# Patient Record
Sex: MALE | Race: WHITE | NOT HISPANIC OR LATINO | Employment: OTHER | URBAN - METROPOLITAN AREA
[De-identification: names, ages, dates, MRNs, and addresses within clinical notes are randomized per-mention and may not be internally consistent; named-entity substitution may affect disease eponyms.]

---

## 2023-05-24 RX ORDER — CLOPIDOGREL BISULFATE 75 MG/1
TABLET ORAL DAILY
COMMUNITY

## 2023-05-24 RX ORDER — TRAMADOL HYDROCHLORIDE 100 MG/1
1 TABLET, COATED ORAL 3 TIMES DAILY
COMMUNITY

## 2023-05-24 RX ORDER — GABAPENTIN 300 MG/1
300 CAPSULE ORAL 3 TIMES DAILY
COMMUNITY

## 2023-05-24 RX ORDER — ENALAPRIL MALEATE 10 MG/1
10 TABLET ORAL DAILY
COMMUNITY

## 2023-05-24 RX ORDER — BUSPIRONE HYDROCHLORIDE 10 MG/1
10 TABLET ORAL DAILY
COMMUNITY

## 2023-05-24 RX ORDER — METOPROLOL TARTRATE 50 MG/1
50 TABLET ORAL 2 TIMES DAILY
COMMUNITY

## 2023-05-24 RX ORDER — ZOLPIDEM TARTRATE 5 MG/1
5 TABLET ORAL NIGHTLY PRN
COMMUNITY

## 2023-05-24 RX ORDER — POLYETHYLENE GLYCOL 3350 17 G/17G
17 POWDER, FOR SOLUTION ORAL DAILY
COMMUNITY

## 2023-05-24 NOTE — DISCHARGE INSTRUCTIONS
Patient Education       Amputation of the Toe Discharge Instructions      What care is needed at home?   Prepare your home to help with your recovery.  Do not change your dressing.  Sponge bathe only. You may take a shower when released by your doctor. No soaking in the tub until your incision is healed.  Follow your doctor's instructions on how you will get around at home. You will need a shoe to walk.  If you smoke, stop. Smoking lowers blood flow and can slow healing.  If you have diabetes, continue to monitor your blood sugar. Take your drugs as ordered.  You may need to wear a special shoe to protect your foot until the stitches are taken out.  You may put gauze and tape to the incision if it bleeds minimally.  What follow-up care is needed?   Be sure to keep your follow up visits.  Your doctor may send you to rehab where you will learn to move around safely and get stronger before you go home.  If you have stitches or staples, you will need to have them taken out. Your doctor will often want to do this in 1 to 2 weeks.  Your doctor may send you to a counselor for talk therapy to help you cope with the loss of your foot or toes.  Will physical activity be limited?   You may have to limit your activity. Talk to your doctor about the right amount of activity for you.  What problems could happen?   Poor wound healing or spread of dead tissue. This might require amputation of more of your foot, toes, or leg.  Infection  Very bad pain in the remaining tissue. This is stump pain.  Painful feeling that the foot or toe is still there. This is phantom pain.  Bleeding  Nerve damage  Limp ? depending on which toe has been removed  When do I need to call the doctor?   Signs of infection. These include a fever of 100.4°F (38°C) or higher, chills, or wound that will not heal.  Signs of wound infection. These include swelling, redness, warmth around the wound; too much pain when touched; yellowish, greenish, or bloody  discharge; foul smell coming from the cut site; cut site opens up.  Trouble breathing or not able to take a deep breath  Numbness or tingling feeling in the rest of your foot, toes, or leg  Chalky white, blue, or black color in the rest of your foot, toes, or leg  Pain that you cannot control with the drugs you have been given

## 2023-05-25 ENCOUNTER — ANESTHESIA (OUTPATIENT)
Dept: SURGERY | Facility: HOSPITAL | Age: 88
End: 2023-05-25
Payer: MEDICARE

## 2023-05-25 ENCOUNTER — ANESTHESIA EVENT (OUTPATIENT)
Dept: SURGERY | Facility: HOSPITAL | Age: 88
End: 2023-05-25
Payer: MEDICARE

## 2023-05-25 ENCOUNTER — HOSPITAL ENCOUNTER (OUTPATIENT)
Facility: HOSPITAL | Age: 88
Discharge: HOME | End: 2023-05-25
Attending: PODIATRIST | Admitting: PODIATRIST
Payer: MEDICARE

## 2023-05-25 DIAGNOSIS — L97.521 NON-PRESSURE CHRONIC ULCER OF OTHER PART OF LEFT FOOT LIMITED TO BREAKDOWN OF SKIN: Primary | ICD-10-CM

## 2023-05-25 DIAGNOSIS — L97.522 ULCER OF LEFT FOOT, WITH FAT LAYER EXPOSED: ICD-10-CM

## 2023-05-25 PROCEDURE — 93005 ELECTROCARDIOGRAM TRACING: CPT

## 2023-05-25 PROCEDURE — 63600175 PHARM REV CODE 636 W HCPCS: Performed by: NURSE ANESTHETIST, CERTIFIED REGISTERED

## 2023-05-25 PROCEDURE — C1729 CATH, DRAINAGE: HCPCS | Performed by: PODIATRIST

## 2023-05-25 PROCEDURE — 25000003 PHARM REV CODE 250: Performed by: PODIATRIST

## 2023-05-25 PROCEDURE — 88305 TISSUE EXAM BY PATHOLOGIST: CPT | Performed by: PODIATRIST

## 2023-05-25 PROCEDURE — 87075 CULTR BACTERIA EXCEPT BLOOD: CPT | Performed by: PODIATRIST

## 2023-05-25 PROCEDURE — 37000009 HC ANESTHESIA EA ADD 15 MINS: Performed by: PODIATRIST

## 2023-05-25 PROCEDURE — 87205 SMEAR GRAM STAIN: CPT | Performed by: PODIATRIST

## 2023-05-25 PROCEDURE — 71000016 HC POSTOP RECOV ADDL HR: Performed by: PODIATRIST

## 2023-05-25 PROCEDURE — 36000707: Performed by: PODIATRIST

## 2023-05-25 PROCEDURE — 36000706: Performed by: PODIATRIST

## 2023-05-25 PROCEDURE — D9220A PRA ANESTHESIA: Mod: ,,, | Performed by: NURSE ANESTHETIST, CERTIFIED REGISTERED

## 2023-05-25 PROCEDURE — 37000008 HC ANESTHESIA 1ST 15 MINUTES: Performed by: PODIATRIST

## 2023-05-25 PROCEDURE — 71000015 HC POSTOP RECOV 1ST HR: Performed by: PODIATRIST

## 2023-05-25 PROCEDURE — 88311 DECALCIFY TISSUE: CPT

## 2023-05-25 PROCEDURE — D9220A PRA ANESTHESIA: ICD-10-PCS | Mod: ,,, | Performed by: NURSE ANESTHETIST, CERTIFIED REGISTERED

## 2023-05-25 PROCEDURE — 87070 CULTURE OTHR SPECIMN AEROBIC: CPT | Performed by: PODIATRIST

## 2023-05-25 RX ORDER — LIDOCAINE HYDROCHLORIDE 10 MG/ML
1 INJECTION, SOLUTION EPIDURAL; INFILTRATION; INTRACAUDAL; PERINEURAL ONCE
Status: DISCONTINUED | OUTPATIENT
Start: 2023-05-25 | End: 2023-05-25 | Stop reason: HOSPADM

## 2023-05-25 RX ORDER — HYDROMORPHONE HYDROCHLORIDE 2 MG/ML
0.2 INJECTION, SOLUTION INTRAMUSCULAR; INTRAVENOUS; SUBCUTANEOUS EVERY 5 MIN PRN
Status: DISCONTINUED | OUTPATIENT
Start: 2023-05-25 | End: 2023-05-25 | Stop reason: HOSPADM

## 2023-05-25 RX ORDER — ONDANSETRON 2 MG/ML
INJECTION INTRAMUSCULAR; INTRAVENOUS
Status: DISCONTINUED | OUTPATIENT
Start: 2023-05-25 | End: 2023-05-25

## 2023-05-25 RX ORDER — SODIUM CHLORIDE 9 MG/ML
INJECTION, SOLUTION INTRAVENOUS CONTINUOUS
Status: DISCONTINUED | OUTPATIENT
Start: 2023-05-25 | End: 2023-05-25 | Stop reason: HOSPADM

## 2023-05-25 RX ORDER — CEFAZOLIN SODIUM 1 G/3ML
1 INJECTION, POWDER, FOR SOLUTION INTRAMUSCULAR; INTRAVENOUS ONCE
Status: DISCONTINUED | OUTPATIENT
Start: 2023-05-25 | End: 2023-05-25 | Stop reason: HOSPADM

## 2023-05-25 RX ORDER — LIDOCAINE HYDROCHLORIDE 10 MG/ML
1 INJECTION, SOLUTION EPIDURAL; INFILTRATION; INTRACAUDAL; PERINEURAL ONCE AS NEEDED
Status: DISCONTINUED | OUTPATIENT
Start: 2023-05-25 | End: 2023-05-25 | Stop reason: HOSPADM

## 2023-05-25 RX ORDER — BUPIVACAINE HYDROCHLORIDE 2.5 MG/ML
INJECTION, SOLUTION EPIDURAL; INFILTRATION; INTRACAUDAL
Status: DISCONTINUED
Start: 2023-05-25 | End: 2023-05-25 | Stop reason: HOSPADM

## 2023-05-25 RX ORDER — ONDANSETRON 2 MG/ML
4 INJECTION INTRAMUSCULAR; INTRAVENOUS ONCE AS NEEDED
Status: DISCONTINUED | OUTPATIENT
Start: 2023-05-25 | End: 2023-05-25 | Stop reason: HOSPADM

## 2023-05-25 RX ORDER — CEFAZOLIN SODIUM 1 G/3ML
INJECTION, POWDER, FOR SOLUTION INTRAMUSCULAR; INTRAVENOUS
Status: DISCONTINUED | OUTPATIENT
Start: 2023-05-25 | End: 2023-05-25

## 2023-05-25 RX ORDER — FENTANYL CITRATE 50 UG/ML
INJECTION, SOLUTION INTRAMUSCULAR; INTRAVENOUS
Status: DISCONTINUED | OUTPATIENT
Start: 2023-05-25 | End: 2023-05-25

## 2023-05-25 RX ORDER — BUPIVACAINE HYDROCHLORIDE 5 MG/ML
INJECTION, SOLUTION EPIDURAL; INTRACAUDAL
Status: DISCONTINUED | OUTPATIENT
Start: 2023-05-25 | End: 2023-05-25 | Stop reason: HOSPADM

## 2023-05-25 RX ORDER — PROPOFOL 10 MG/ML
VIAL (ML) INTRAVENOUS
Status: DISCONTINUED | OUTPATIENT
Start: 2023-05-25 | End: 2023-05-25

## 2023-05-25 RX ADMIN — FENTANYL CITRATE 50 MCG: 50 INJECTION, SOLUTION INTRAMUSCULAR; INTRAVENOUS at 02:05

## 2023-05-25 RX ADMIN — PROPOFOL 20 MG: 10 INJECTION, EMULSION INTRAVENOUS at 02:05

## 2023-05-25 RX ADMIN — PROPOFOL 30 MG: 10 INJECTION, EMULSION INTRAVENOUS at 02:05

## 2023-05-25 RX ADMIN — SODIUM CHLORIDE, POTASSIUM CHLORIDE, SODIUM LACTATE AND CALCIUM CHLORIDE: 600; 310; 30; 20 INJECTION, SOLUTION INTRAVENOUS at 02:05

## 2023-05-25 RX ADMIN — CEFAZOLIN 1 G: 330 INJECTION, POWDER, FOR SOLUTION INTRAMUSCULAR; INTRAVENOUS at 02:05

## 2023-05-25 RX ADMIN — ONDANSETRON 4 MG: 2 INJECTION INTRAMUSCULAR; INTRAVENOUS at 02:05

## 2023-05-25 RX ADMIN — PROPOFOL 50 MG: 10 INJECTION, EMULSION INTRAVENOUS at 02:05

## 2023-05-25 NOTE — ANESTHESIA POSTPROCEDURE EVALUATION
Anesthesia Post Evaluation    Patient: Milo Camara    Procedure(s) Performed: Procedure(s) (LRB):  AMPUTATION, TOE  Partial amputation Left 5th digit (Left)    Final Anesthesia Type: MAC      Patient location during evaluation: OPS  Patient participation: Yes- Able to Participate  Level of consciousness: awake and alert  Post-procedure vital signs: reviewed and stable  Pain management: adequate  Airway patency: patent    PONV status at discharge: No PONV  Anesthetic complications: no      Cardiovascular status: blood pressure returned to baseline  Respiratory status: unassisted and room air  Hydration status: euvolemic  Follow-up not needed.          Vitals Value Taken Time   /97 05/25/23 1519   Temp 36.6 °C (97.8 °F) 05/25/23 1138   Pulse 116 05/25/23 1520   Resp 22 05/25/23 1520   SpO2 94 % 05/25/23 1520   Vitals shown include unvalidated device data.      No case tracking events are documented in the log.      Pain/Yrn Score: No data recorded

## 2023-05-25 NOTE — TRANSFER OF CARE
"Anesthesia Transfer of Care Note    Patient: Milo Camara    Procedure(s) Performed: Procedure(s) (LRB):  AMPUTATION, TOE  Partial amputation Left 5th digit (Left)    Patient location: OPS    Anesthesia Type: MAC    Transport from OR: Transported from OR on room air with adequate spontaneous ventilation    Post pain: adequate analgesia    Post assessment: no apparent anesthetic complications    Post vital signs: stable    Level of consciousness: awake, alert and oriented    Nausea/Vomiting: no nausea/vomiting    Complications: none    Transfer of care protocol was followed      Last vitals:   Visit Vitals  BP (!) 174/84   Pulse 96   Temp 36.6 °C (97.8 °F) (Oral)   Ht 5' 8" (1.727 m)   Wt 59.1 kg (130 lb 4.7 oz)   SpO2 95%   BMI 19.81 kg/m²     "

## 2023-05-26 LAB
GRAM STN SPEC: NORMAL

## 2023-05-28 VITALS
SYSTOLIC BLOOD PRESSURE: 156 MMHG | HEART RATE: 82 BPM | WEIGHT: 130.31 LBS | BODY MASS INDEX: 19.75 KG/M2 | TEMPERATURE: 98 F | DIASTOLIC BLOOD PRESSURE: 64 MMHG | HEIGHT: 68 IN | OXYGEN SATURATION: 100 %

## 2023-05-28 LAB
BACTERIA SPEC ANAEROBE CULT: NORMAL
BACTERIA SPEC CULT: ABNORMAL
BACTERIA SPEC CULT: ABNORMAL

## 2023-05-30 LAB — PSYCHE PATHOLOGY RESULT: NORMAL

## 2023-06-01 NOTE — OP NOTE
[facility] [address] [place] [address]    PATIENT NAME:      BIANCA OLIVAS   YOB: 1929  CSN:               [id number]  MRN:               [id number]  ADMIT DATE:        05/25/2023 11:22:00  PHYSICIAN:         Osman Sarmiento DPM                          OPERATIVE REPORT      DATE OF SURGERY:    05/25/2023 00:00:00    SURGEON:  Osman Sarmiento DPM    ASSISTANTS:  None.    PREOPERATIVE DIAGNOSIS:  Left fifth digit osteomyelitis.    POSTOPERATIVE DIAGNOSIS:  Left fifth digit osteomyelitis.    PROCEDURE:  Partial amputation of left fifth digit.    HEMOSTASIS:  Anatomic.    ESTIMATED BLOOD LOSS:  Less than 10 mL.    INJECTABLES:  About 10 mL of 0.5% Marcaine plain.    MATERIALS:  4-0 Vicryl, 4-0 nylon.    COMPLICATIONS:  None.    PROCEDURE IN DETAIL:  Under mild sedation, the patient was placed  in a supine   position.  Following IV sedation, the patient's left lower extremity was then   scrubbed, prepped, and draped in usual aseptic manner.  An intraoperative block   consisting of about 10 mL of 0.5% Marcaine plain was injected into the surgical   field.  Next, attention was directed to the dorsal aspect of the left fifth   digit, where an ulceration was present as well as exposure of the distal phalanx   of the digit.  Utilizing a 15 blade, a linear incision was made over the digit.    The incision was deepened through subcutaneous tissue using both blunt and   sharp dissection.  Care was taken to identify and retract any vital   neurovascular structures.  Next, the distal phalanx was disarticulated at the   IPJ joint.  The phalanx was then passed free from the surgical field, placed on   the back table to be sent for microbiology culture.  During this time, copious   amounts of healthy bleeding was noted.  The remaining tissue appeared to be   healthy.  The area was then flushed with  normal saline.  The deeper layers were   reapproximated and coapted utilizing 4-0 Vicryl and the skin was reapproximated   and coapted utilizing 4-0.  A very light dressing was applied consisting of a   nonadherent 4x4s, Domenico, and tape.  No Ace wrap or Coban wrap was applied to the   patient's digit.  The patient tolerated the procedure well with no   complications.  The patient was transferred to PACU with all vital signs stable   and neurovascular status intact.  Following a period of postoperative   monitoring, the patient is to be discharged home with the following written and   oral instructions:  1. Keep dressings clean, dry, intact.  2. The patient is to elevate left foot.  The patient is not to apply ice to his   left foot.  3. The patient may ambulate as necessary in a postoperative shoe.  4. The patient is to finish all antibiotics as well as prescription medications.    The patient is to call my office for any questions or concerns.  5. The patient is to follow up in my office next week.        ______________________________  KYLAH Donnelly/BROCK  DD:  05/31/2023  Time:  01:00PM  DT:  05/31/2023  Time:  07:57PM  Job #:  [id number]      OPERATIVE REPORT

## 2023-06-08 ENCOUNTER — LAB REQUISITION (OUTPATIENT)
Dept: LAB | Facility: HOSPITAL | Age: 88
End: 2023-06-08
Payer: MEDICARE

## 2023-06-08 DIAGNOSIS — R79.9 ABNORMAL FINDING OF BLOOD CHEMISTRY, UNSPECIFIED: ICD-10-CM

## 2023-06-08 DIAGNOSIS — E03.9 HYPOTHYROIDISM, UNSPECIFIED: ICD-10-CM

## 2023-06-08 LAB
ALBUMIN SERPL-MCNC: 3.2 G/DL (ref 3.4–4.8)
ALBUMIN/GLOB SERPL: 1 RATIO (ref 1.1–2)
ALP SERPL-CCNC: 75 UNIT/L (ref 40–150)
ALT SERPL-CCNC: 16 UNIT/L (ref 0–55)
AST SERPL-CCNC: 20 UNIT/L (ref 5–34)
BASOPHILS # BLD AUTO: 0.06 X10(3)/MCL
BASOPHILS NFR BLD AUTO: 1 %
BILIRUBIN DIRECT+TOT PNL SERPL-MCNC: 0.4 MG/DL
BUN SERPL-MCNC: 27.5 MG/DL (ref 8.4–25.7)
CALCIUM SERPL-MCNC: 9.7 MG/DL (ref 8.8–10)
CHLORIDE SERPL-SCNC: 112 MMOL/L (ref 98–111)
CHOLEST SERPL-MCNC: 155 MG/DL
CHOLEST/HDLC SERPL: 4 {RATIO} (ref 0–5)
CO2 SERPL-SCNC: 21 MMOL/L (ref 23–31)
CREAT SERPL-MCNC: 1.07 MG/DL (ref 0.73–1.18)
DEPRECATED CALCIDIOL+CALCIFEROL SERPL-MC: 31.4 NG/ML (ref 30–80)
EOSINOPHIL # BLD AUTO: 0.21 X10(3)/MCL (ref 0–0.9)
EOSINOPHIL NFR BLD AUTO: 3.4 %
ERYTHROCYTE [DISTWIDTH] IN BLOOD BY AUTOMATED COUNT: 13.2 % (ref 11.5–17)
GFR SERPLBLD CREATININE-BSD FMLA CKD-EPI: >60 MLS/MIN/1.73/M2
GLOBULIN SER-MCNC: 3.1 GM/DL (ref 2.4–3.5)
GLUCOSE SERPL-MCNC: 90 MG/DL (ref 75–121)
HCT VFR BLD AUTO: 31.9 % (ref 42–52)
HDLC SERPL-MCNC: 44 MG/DL (ref 35–60)
HGB BLD-MCNC: 10.6 G/DL (ref 14–18)
IMM GRANULOCYTES # BLD AUTO: 0.03 X10(3)/MCL (ref 0–0.04)
IMM GRANULOCYTES NFR BLD AUTO: 0.5 %
LDLC SERPL CALC-MCNC: 97 MG/DL (ref 50–140)
LYMPHOCYTES # BLD AUTO: 1.49 X10(3)/MCL (ref 0.6–4.6)
LYMPHOCYTES NFR BLD AUTO: 24 %
MCH RBC QN AUTO: 30.7 PG (ref 27–31)
MCHC RBC AUTO-ENTMCNC: 33.2 G/DL (ref 33–36)
MCV RBC AUTO: 92.5 FL (ref 80–94)
MONOCYTES # BLD AUTO: 0.5 X10(3)/MCL (ref 0.1–1.3)
MONOCYTES NFR BLD AUTO: 8.1 %
NEUTROPHILS # BLD AUTO: 3.92 X10(3)/MCL (ref 2.1–9.2)
NEUTROPHILS NFR BLD AUTO: 63 %
NRBC BLD AUTO-RTO: 0 %
PLATELET # BLD AUTO: 215 X10(3)/MCL (ref 130–400)
PMV BLD AUTO: 10.4 FL (ref 7.4–10.4)
POTASSIUM SERPL-SCNC: 4.3 MMOL/L (ref 3.5–5.1)
PREALB SERPL-MCNC: 16.9 MG/DL (ref 16–42)
PROT SERPL-MCNC: 6.3 GM/DL (ref 5.8–7.6)
RBC # BLD AUTO: 3.45 X10(6)/MCL (ref 4.7–6.1)
SODIUM SERPL-SCNC: 141 MMOL/L (ref 132–146)
T4 FREE SERPL-MCNC: 1.12 NG/DL (ref 0.7–1.48)
TRIGL SERPL-MCNC: 72 MG/DL (ref 34–140)
TSH SERPL-ACNC: 1.16 UIU/ML (ref 0.35–4.94)
VLDLC SERPL CALC-MCNC: 14 MG/DL
WBC # SPEC AUTO: 6.21 X10(3)/MCL (ref 4.5–11.5)

## 2023-06-08 PROCEDURE — 85025 COMPLETE CBC W/AUTO DIFF WBC: CPT | Performed by: NURSE PRACTITIONER

## 2023-06-08 PROCEDURE — 80053 COMPREHEN METABOLIC PANEL: CPT | Performed by: NURSE PRACTITIONER

## 2023-06-08 PROCEDURE — 84134 ASSAY OF PREALBUMIN: CPT | Performed by: NURSE PRACTITIONER

## 2023-06-08 PROCEDURE — 80061 LIPID PANEL: CPT | Performed by: NURSE PRACTITIONER

## 2023-06-08 PROCEDURE — 82306 VITAMIN D 25 HYDROXY: CPT | Performed by: NURSE PRACTITIONER

## 2023-06-08 PROCEDURE — 84439 ASSAY OF FREE THYROXINE: CPT | Performed by: NURSE PRACTITIONER

## 2023-06-08 PROCEDURE — 84443 ASSAY THYROID STIM HORMONE: CPT | Performed by: NURSE PRACTITIONER

## 2024-05-24 ENCOUNTER — HOSPITAL ENCOUNTER (EMERGENCY)
Facility: HOSPITAL | Age: 89
Discharge: HOME | End: 2024-05-24
Attending: EMERGENCY MEDICINE
Payer: MEDICARE

## 2024-05-24 VITALS
HEIGHT: 67 IN | TEMPERATURE: 98 F | BODY MASS INDEX: 19.15 KG/M2 | HEART RATE: 91 BPM | OXYGEN SATURATION: 97 % | WEIGHT: 122 LBS | SYSTOLIC BLOOD PRESSURE: 132 MMHG | DIASTOLIC BLOOD PRESSURE: 92 MMHG | RESPIRATION RATE: 20 BRPM

## 2024-05-24 DIAGNOSIS — T14.90XA TRAUMA: ICD-10-CM

## 2024-05-24 DIAGNOSIS — S09.90XA MINOR HEAD INJURY, INITIAL ENCOUNTER: ICD-10-CM

## 2024-05-24 DIAGNOSIS — S01.81XA FACIAL LACERATION, INITIAL ENCOUNTER: Primary | ICD-10-CM

## 2024-05-24 LAB
ABORH RETYPE: NORMAL
ALBUMIN SERPL-MCNC: 3.9 G/DL (ref 3.4–4.8)
ALBUMIN/GLOB SERPL: 1.8 RATIO (ref 1.1–2)
ALP SERPL-CCNC: 64 UNIT/L (ref 40–150)
ALT SERPL-CCNC: 18 UNIT/L (ref 0–55)
ANION GAP SERPL CALC-SCNC: 7 MEQ/L
ANTIBODY IDENTIFICATION: NORMAL
AST SERPL-CCNC: 23 UNIT/L (ref 5–34)
BASOPHILS # BLD AUTO: 0.03 X10(3)/MCL
BASOPHILS NFR BLD AUTO: 0.6 %
BILIRUB SERPL-MCNC: 0.2 MG/DL
BUN SERPL-MCNC: 35.6 MG/DL (ref 8.4–25.7)
CALCIUM SERPL-MCNC: 8.9 MG/DL (ref 8.8–10)
CHLORIDE SERPL-SCNC: 105 MMOL/L (ref 98–111)
CO2 SERPL-SCNC: 19 MMOL/L (ref 23–31)
CREAT SERPL-MCNC: 2.35 MG/DL (ref 0.73–1.18)
CREAT/UREA NIT SERPL: 15
EOSINOPHIL # BLD AUTO: 0.05 X10(3)/MCL (ref 0–0.9)
EOSINOPHIL NFR BLD AUTO: 1 %
ERYTHROCYTE [DISTWIDTH] IN BLOOD BY AUTOMATED COUNT: 13.9 % (ref 11.5–17)
GFR SERPLBLD CREATININE-BSD FMLA CKD-EPI: 25 ML/MIN/1.73/M2
GLOBULIN SER-MCNC: 2.2 GM/DL (ref 2.4–3.5)
GLUCOSE SERPL-MCNC: 112 MG/DL (ref 75–121)
GROUP & RH: ABNORMAL
HCT VFR BLD AUTO: 25.3 % (ref 42–52)
HGB BLD-MCNC: 8.3 G/DL (ref 14–18)
IMM GRANULOCYTES # BLD AUTO: 0.05 X10(3)/MCL (ref 0–0.04)
IMM GRANULOCYTES NFR BLD AUTO: 1 %
INDIRECT COOMBS: ABNORMAL
INR PPP: 1
LYMPHOCYTES # BLD AUTO: 0.77 X10(3)/MCL (ref 0.6–4.6)
LYMPHOCYTES NFR BLD AUTO: 16 %
MCH RBC QN AUTO: 33.1 PG (ref 27–31)
MCHC RBC AUTO-ENTMCNC: 32.8 G/DL (ref 33–36)
MCV RBC AUTO: 100.8 FL (ref 80–94)
MONOCYTES # BLD AUTO: 0.36 X10(3)/MCL (ref 0.1–1.3)
MONOCYTES NFR BLD AUTO: 7.5 %
NEUTROPHILS # BLD AUTO: 3.55 X10(3)/MCL (ref 2.1–9.2)
NEUTROPHILS NFR BLD AUTO: 73.9 %
NRBC BLD AUTO-RTO: 0 %
PLATELET # BLD AUTO: 165 X10(3)/MCL (ref 130–400)
PMV BLD AUTO: 8.4 FL (ref 7.4–10.4)
POTASSIUM SERPL-SCNC: 5.4 MMOL/L (ref 3.5–5.1)
PROT SERPL-MCNC: 6.1 GM/DL (ref 5.8–7.6)
PROTHROMBIN TIME: 12.7 SECONDS (ref 12.5–14.5)
RBC # BLD AUTO: 2.51 X10(6)/MCL (ref 4.7–6.1)
SODIUM SERPL-SCNC: 131 MMOL/L (ref 136–145)
SPECIMEN OUTDATE: ABNORMAL
WBC # SPEC AUTO: 4.81 X10(3)/MCL (ref 4.5–11.5)

## 2024-05-24 PROCEDURE — 90471 IMMUNIZATION ADMIN: CPT | Performed by: EMERGENCY MEDICINE

## 2024-05-24 PROCEDURE — 86905 BLOOD TYPING RBC ANTIGENS: CPT | Mod: 91 | Performed by: EMERGENCY MEDICINE

## 2024-05-24 PROCEDURE — 90715 TDAP VACCINE 7 YRS/> IM: CPT | Performed by: EMERGENCY MEDICINE

## 2024-05-24 PROCEDURE — 99285 EMERGENCY DEPT VISIT HI MDM: CPT | Mod: 25

## 2024-05-24 PROCEDURE — 80053 COMPREHEN METABOLIC PANEL: CPT | Performed by: EMERGENCY MEDICINE

## 2024-05-24 PROCEDURE — 63600175 PHARM REV CODE 636 W HCPCS: Performed by: EMERGENCY MEDICINE

## 2024-05-24 PROCEDURE — G0390 TRAUMA RESPONS W/HOSP CRITI: HCPCS

## 2024-05-24 PROCEDURE — 85025 COMPLETE CBC W/AUTO DIFF WBC: CPT | Performed by: EMERGENCY MEDICINE

## 2024-05-24 PROCEDURE — 12011 RPR F/E/E/N/L/M 2.5 CM/<: CPT

## 2024-05-24 PROCEDURE — 85610 PROTHROMBIN TIME: CPT | Performed by: EMERGENCY MEDICINE

## 2024-05-24 PROCEDURE — 86870 RBC ANTIBODY IDENTIFICATION: CPT | Performed by: EMERGENCY MEDICINE

## 2024-05-24 PROCEDURE — 86850 RBC ANTIBODY SCREEN: CPT | Performed by: EMERGENCY MEDICINE

## 2024-05-24 PROCEDURE — 86900 BLOOD TYPING SEROLOGIC ABO: CPT | Performed by: EMERGENCY MEDICINE

## 2024-05-24 PROCEDURE — 86901 BLOOD TYPING SEROLOGIC RH(D): CPT | Performed by: EMERGENCY MEDICINE

## 2024-05-24 RX ORDER — SODIUM CHLORIDE, SODIUM LACTATE, POTASSIUM CHLORIDE, CALCIUM CHLORIDE 600; 310; 30; 20 MG/100ML; MG/100ML; MG/100ML; MG/100ML
1000 INJECTION, SOLUTION INTRAVENOUS
Status: COMPLETED | OUTPATIENT
Start: 2024-05-24 | End: 2024-05-24

## 2024-05-24 RX ORDER — LIDOCAINE HYDROCHLORIDE 10 MG/ML
5 INJECTION INFILTRATION; PERINEURAL
Status: DISCONTINUED | OUTPATIENT
Start: 2024-05-24 | End: 2024-05-24 | Stop reason: HOSPADM

## 2024-05-24 RX ADMIN — SODIUM CHLORIDE, POTASSIUM CHLORIDE, SODIUM LACTATE AND CALCIUM CHLORIDE 1000 ML: 600; 310; 30; 20 INJECTION, SOLUTION INTRAVENOUS at 05:05

## 2024-05-24 RX ADMIN — TETANUS TOXOID, REDUCED DIPHTHERIA TOXOID AND ACELLULAR PERTUSSIS VACCINE, ADSORBED 0.5 ML: 5; 2.5; 8; 8; 2.5 SUSPENSION INTRAMUSCULAR at 05:05

## 2024-05-24 NOTE — ED PROVIDER NOTES
Encounter Date: 5/24/2024    SCRIBE #1 NOTE: ILaurie, litzy scribing for, and in the presence of,  Hamlet Albrecht MD. I have scribed the following portions of the note - Other sections scribed: HPI, ROS, PE.       History   No chief complaint on file.    89 year old male with an unknown pmhx presents to the ED via EMS as a level 2 trauma for a fall that occurred PTA.  The patient reports associated symptoms of left hand pain.  The patient reports that he is on dialysis.  Per EMS, the patient was walking with his walker in a hallway when he tripped and fell.  They report that the patient is on Plavix.  The patient was placed in a C-collar PTA, and changed into a new one upon arrival.    The history is provided by the patient and the EMS personnel. No  was used.   Trauma  This is a new problem. The current episode started less than 1 hour ago. Pertinent negatives include no chest pain, no abdominal pain and no shortness of breath.     Review of patient's allergies indicates:  No Known Allergies  No past medical history on file.  No past surgical history on file.  No family history on file.     Review of Systems   Constitutional:  Negative for fatigue, fever and unexpected weight change.   HENT:  Negative for congestion and rhinorrhea.    Eyes:  Negative for pain.   Respiratory:  Negative for chest tightness, shortness of breath and wheezing.    Cardiovascular:  Negative for chest pain.   Gastrointestinal:  Negative for abdominal pain, constipation, diarrhea, nausea and vomiting.   Genitourinary:  Negative for dysuria.   Musculoskeletal:  Negative for back pain and neck pain.        Left hand pain   Skin:  Negative for rash.   Allergic/Immunologic: Negative for environmental allergies, food allergies and immunocompromised state.   Neurological:  Negative for dizziness and speech difficulty.   Hematological:  Does not bruise/bleed easily.   Psychiatric/Behavioral:  Negative for  sleep disturbance and suicidal ideas.        Physical Exam     Initial Vitals   BP Pulse Resp Temp SpO2   05/24/24 1719 05/24/24 1717 05/24/24 1717 05/24/24 1717 05/24/24 1717   (!) 162/71 96 20 97.7 °F (36.5 °C) 96 %      MAP       --                Physical Exam    Nursing note and vitals reviewed.  Constitutional: He appears well-developed and well-nourished. Cervical collar in place.   HENT:   Head: Normocephalic.   Eyes: EOM are normal. Pupils are equal, round, and reactive to light.   Cardiovascular:  Normal rate, regular rhythm, normal heart sounds and intact distal pulses.           Pulmonary/Chest: Breath sounds normal.   Abdominal: Abdomen is soft. Bowel sounds are normal.   Musculoskeletal:      Comments: Contusion to the left shoulder      Neurological: He is alert and oriented to person, place, and time. He has normal strength. GCS score is 15. GCS eye subscore is 4. GCS verbal subscore is 5. GCS motor subscore is 6.   Skin: Skin is warm and dry. Capillary refill takes less than 2 seconds.   2 1/2 cm skin tear to the left wrist   Psychiatric: He has a normal mood and affect. Judgment normal.         ED Course   Lac Repair    Date/Time: 6/14/2024 9:38 PM    Performed by: Hamlet Albrecht III, MD  Authorized by: Hamlet Albrecht III, MD    Consent:     Consent obtained:  Verbal    Consent given by:  Patient    Risks, benefits, and alternatives were discussed: yes      Risks discussed:  Poor cosmetic result and need for additional repair  Universal protocol:     Patient identity confirmed:  Verbally with patient  Anesthesia:     Anesthesia method:  Local infiltration  Laceration details:     Location:  Face    Length (cm):  1    Depth (mm):  2  Pre-procedure details:     Preparation:  Patient was prepped and draped in usual sterile fashion and imaging obtained to evaluate for foreign bodies  Exploration:     Limited defect created (wound extended): no      Hemostasis achieved with:  Direct  pressure  Treatment:     Area cleansed with:  Povidone-iodine    Amount of cleaning:  Standard    Irrigation solution:  Sterile saline    Irrigation method:  Syringe    Visualized foreign bodies/material removed: no      Debridement:  None  Skin repair:     Repair method:  Sutures    Suture size:  5-0    Suture material:  Plain gut    Number of sutures:  2  Approximation:     Approximation:  Close  Repair type:     Repair type:  Intermediate  Post-procedure details:     Dressing:  Antibiotic ointment    Procedure completion:  Tolerated well, no immediate complications    Labs Reviewed   COMPREHENSIVE METABOLIC PANEL - Abnormal; Notable for the following components:       Result Value    Sodium 131 (*)     Potassium 5.4 (*)     CO2 19 (*)     Blood Urea Nitrogen 35.6 (*)     Creatinine 2.35 (*)     Globulin 2.2 (*)     All other components within normal limits   CBC WITH DIFFERENTIAL - Abnormal; Notable for the following components:    RBC 2.51 (*)     Hgb 8.3 (*)     Hct 25.3 (*)     .8 (*)     MCH 33.1 (*)     MCHC 32.8 (*)     IG# 0.05 (*)     All other components within normal limits   TYPE & SCREEN - Abnormal; Notable for the following components:    Indirect [place] GEL POS (*)     All other components within normal limits   PROTIME-INR - Normal   CBC W/ AUTO DIFFERENTIAL    Narrative:     The following orders were created for panel order CBC auto differential.  Procedure                               Abnormality         Status                     ---------                               -----------         ------                     CBC with Differential[[id number]]       Abnormal            Final result                 Please view results for these tests on the individual orders.   URINALYSIS, REFLEX TO URINE CULTURE   ABORH RETYPE          Imaging Results              CT Cervical Spine Without Contrast (Final result)  Result time 05/24/24 17:42:06      Final result by Marie Reyes MD (05/24/24  17:42:06)                   Impression:      No acute fracture identified.      Electronically signed by: Marie Reyes  Date:    05/24/2024  Time:    17:42               Narrative:    EXAMINATION:  CT CERVICAL SPINE WITHOUT CONTRAST    CLINICAL HISTORY:  Trauma;    TECHNIQUE:  Noncontrast CT images of the cervical spine. Axial, coronal, and sagittal reformatted images were obtained. Dose length product is 1298 mGycm. Automatic exposure control, adjustment of mA/kV or iterative reconstruction technique was used to limit radiation dose.    COMPARISON:  None    FINDINGS:  The cervical spine is visualized through the level of T1.    There is no acute fracture identified.  There are multilevel degenerative changes with disc height loss, marginal osteophyte formation and facet arthropathy.  There is no paraspinal hematoma.                                       CT Head Without Contrast (Final result)  Result time 05/24/24 17:39:50      Final result by Marie Reyes MD (05/24/24 17:39:50)                   Impression:      1. No acute intracranial abnormality.  2. Chronic microvascular ischemic changes.      Electronically signed by: Marie Reyes  Date:    05/24/2024  Time:    17:39               Narrative:    EXAMINATION:  CT HEAD WITHOUT CONTRAST    CLINICAL HISTORY:  Trauma;    TECHNIQUE:  Axial scans were obtained from skull base to the vertex.    Coronal and sagittal reconstructions obtained from the axial data.    Automatic exposure control was utilized to limit radiation dose.    Contrast: None    Radiation Dose:    Total DLP: 1298 mGy*cm    COMPARISON:  None    FINDINGS:  There is no acute intracranial hemorrhage or edema. The gray-white matter differentiation is preserved.  Patchy hypodensities in the subcortical and periventricular white matter likely represent chronic microvascular ischemic changes.    There is no mass effect or midline shift.  There is diffuse parenchymal volume loss.  The basal  cisterns are patent. There is no abnormal extra-axial fluid collection.  Carotid and vertebral artery calcifications are noted.    The calvarium and skull base are intact. The visualized paranasal sinuses and the mastoid air cells are clear.  There is mild left periorbital soft tissue swelling.                                       X-Ray Elbow Complete Left (Final result)  Result time 05/24/24 17:44:58      Final result by Marie Reyes MD (05/24/24 17:44:58)                   Impression:      No acute bony abnormality.      Electronically signed by: Marie Reyes  Date:    05/24/2024  Time:    17:44               Narrative:    EXAMINATION:  XR ELBOW COMPLETE 3 VIEW LEFT    CLINICAL HISTORY:  Injury, unspecified, initial encounter    COMPARISON:  None.    FINDINGS:  There is no acute fracture identified.  There is no elbow joint effusion.                                       X-Ray Shoulder 2 or More Views Left (Final result)  Result time 05/24/24 17:45:27      Final result by Marie Reyes MD (05/24/24 17:45:27)                   Impression:      No acute bony abnormality.      Electronically signed by: Marie Reyes  Date:    05/24/2024  Time:    17:45               Narrative:    EXAMINATION:  XR SHOULDER COMPLETE 2 OR MORE VIEWS LEFT    CLINICAL HISTORY:  weakness;    COMPARISON:  None.    FINDINGS:  There is no acute fracture or malalignment.  The soft tissues are unremarkable.                                       X-Ray Pelvis Routine AP (Final result)  Result time 05/24/24 17:46:08      Final result by Marie Reyes MD (05/24/24 17:46:08)                   Impression:      No displaced fracture identified.      Electronically signed by: Marie Reyes  Date:    05/24/2024  Time:    17:46               Narrative:    EXAMINATION:  XR PELVIS ROUTINE AP    CLINICAL HISTORY:  r/o bleeding or hemorrhage;    COMPARISON:  None.    FINDINGS:  There is no displaced fracture identified.   Vascular calcifications are noted.                                       X-Ray Chest 1 View (Final result)  Result time 05/24/24 17:46:27      Final result by Marie Reyes MD (05/24/24 17:46:27)                   Impression:      No acute abnormality of the chest.      Electronically signed by: Marie Reyes  Date:    05/24/2024  Time:    17:46               Narrative:    EXAMINATION:  XR CHEST 1 VIEW    CLINICAL HISTORY:  r/o bleeding or hemorrhage;    TECHNIQUE:  AP chest    COMPARISON:  None.    FINDINGS:  The heart is normal in size.  The lungs are clear.  There is no pleural effusion or visible pneumothorax.                                       Medications   LIDOcaine HCL 10 mg/ml (1%) injection 5 mL (has no administration in time range)   Tdap (BOOSTRIX) vaccine injection 0.5 mL (0.5 mLs Intramuscular Given 5/24/24 1721)   lactated ringers infusion (1,000 mLs Intravenous New Bag 5/24/24 1723)     Medical Decision Making  The differential diagnoses includes but is not limited to: facial fracture, skull fracture, intracranial hemorrhage, intracranial injury, left elbow fracture, left shoulder fracture.    X-rays without abnormality CT head and neck normal Cervical collar removed by me upon receipt of negative CT cervical spine full range of motion of neck without any hesitation tenderness or discomfort neurologically intact throughout entire procedure    Laceration closed per nurse practitioner with good approximation pain well-controlled patient will be discharged      Problems Addressed:  Facial laceration, initial encounter: acute illness or injury    Amount and/or Complexity of Data Reviewed  Independent Historian: EMS     Details:  Per EMS, the patient was walking with his walker in a hallway when he tripped and fell.  They report that the patient is on Plavix.    Labs: ordered. Decision-making details documented in ED Course.  Radiology: ordered. Decision-making details documented in ED  Course.    Risk  Prescription drug management.            Scribe Attestation:   Scribe #1: I performed the above scribed service and the documentation accurately describes the services I performed. I attest to the accuracy of the note.    Attending Attestation:           Physician Attestation for Scribe:  Physician Attestation Statement for Scribe #1: I, Hamlet Albrecht MD, reviewed documentation, as scribed by Laurie Priest in my presence, and it is both accurate and complete.                                    Clinical Impression:  Final diagnoses:  [T14.90XA] Trauma  [S01.81XA] Facial laceration, initial encounter (Primary)  [S09.90XA] Minor head injury, initial encounter          ED Disposition Condition    Discharge Stable          ED Prescriptions    None       Follow-up Information       Follow up With Specialties Details Why Contact Info    PCP  In 3 days               Hamlet Albrecht III, MD  05/24/24 2052       Hamlet Albrecht III, MD  05/24/24 2101       Hamlet Albrecht III, MD  06/14/24 [id number]       Hamlet Albrecht III, MD  06/19/24 1500

## 2024-05-24 NOTE — CONSULTS
"   Trauma Surgery   Activation Note    Patient Name: Ciara Christopher  MRN: [id number]   YOB: 1931  Date: 05/24/2024    LEVEL 2 TRAUMA     Subjective:   History of present illness: Patient is an approximately 89 year old M presenting to the ED as a level 2 trauma s/p fall from standing prior to arrival. Per pt report it was a trip and fall. He denies any pain at this time. Pt is GCS 15 on arrival and hemodynamically stable. Pt is taking plavix s/p cardiac stent in remote past.     Primary Survey:  A Protecting airway   B Bilateral breath sounds clear and equal   C Circulation intact   D GCS 15(E 4, V 5, M 6)    E exposed, log-rolled and examined (see below)   F See below     VITAL SIGNS: 24 HR MIN & MAX LAST   Temp  Min: 97.7 °F (36.5 °C)  Max: 97.7 °F (36.5 °C)  97.7 °F (36.5 °C)   BP  Min: 140/58  Max: 162/71  (!) 140/58    Pulse  Min: 89  Max: 96  89    Resp  Min: 13  Max: 20  18    SpO2  Min: 96 %  Max: 97 %  96 %      HT: 5' 7" (170.2 cm)  WT: 55.3 kg (122 lb)  BMI: 19.1     FAST: deferred    Medications/transfusions received en-route: none  Medications/transfusions received in trauma bay: tetanus    Scheduled Meds:  Continuous Infusions:  PRN Meds:    ROS: 12 point ROS negative except as stated in HPI    Allergies: NKDA  PMH:  CAD s/p stent, ESRD on dialysis  PSH:  exploratory laparotomy  Social history: Noncontributory  Objective:   Secondary Survey:   General: Well developed, well nourished, no acute distress, AAOx3  Neuro: CNII-XII grossly intact  HEENT:  Normocephalic,PERRL, cervical collar in place, small laceration to L lateral forehead  CV:  RRR  Pulse: 1+ radials b/l  Resp/chest:  Non-labored breathing, satting on room air  GI:  Abdomen soft, non-tender, non-distended  :  perineum clear  Rectal: Normal tone, no gross blood.  Extremities: Moves all 4 spontaneously and purposefully, no obvious gross deformities.  Back/Spine: No bony TTP, no palpable step offs or deformities.  Cervical " back: Normal. No tenderness.  Thoracic back: Normal. No tenderness.  Lumbar back: Normal. No tenderness.  Skin/wounds:  Warm, well perfused, scattered abrasions and skin tears, primarily to L forearm and elbow  Psych: Normal mood and affect.    Labs:  Hgb 8.3  Cr 2.35  K 5.4    Imaging:  CT c spine:  The cervical spine is visualized through the level of T1.     There is no acute fracture identified.  There are multilevel degenerative changes with disc height loss, marginal osteophyte formation and facet arthropathy.  There is no paraspinal hematoma.    CT head:   1. No acute intracranial abnormality.  2. Chronic microvascular ischemic changes.    Assessment & Plan:   Pt is a 89yo M with ESRD on dialysis and CAD s/p PCI on plavix, presenting after a mechanical fall from standing as a level 2 trauma activation.    - pt with scattered skin tears, but no significant traumatic injuries after initial evaluation  - expected lab abnormalities related to ESRD  - Recommend wound care and closure of skin tears  - No traumatic injury requiring admission  - Disposition per ED    Liz Macedo MD

## (undated) DEVICE — DRAIN JP STD PENROSE 1/4X12IN

## (undated) DEVICE — GLOVE PROTEXIS HYDROGEL SZ6.5

## (undated) DEVICE — GAUZE SPONGE 4X4 12PLY

## (undated) DEVICE — BANDAGE ESMARK ELASTIC ST 4X9

## (undated) DEVICE — DRAPE U-DRAPE ADHESIVE 60X60IN

## (undated) DEVICE — Device

## (undated) DEVICE — BLADE SURG STAINLESS STEEL #15

## (undated) DEVICE — TOURNIQUET SB QC DP 24X4IN

## (undated) DEVICE — SOL NACL IRR 1000ML BTL

## (undated) DEVICE — BLANKET SNUGGLE WARM ADLT SM

## (undated) DEVICE — DRAPE MEDIUM SHEET 40X70IN